# Patient Record
(demographics unavailable — no encounter records)

---

## 2025-02-24 NOTE — ASSESSMENT
[FreeTextEntry1] : I discussed the findings and options with Ms. MARK MCHUGH in detail.   We reviewed the implications of OAB medications, anticholinergic v beta agonists. I have described the risks and benefits to this approach.   - We agreed that she will start trial of gemtesa daily for 1 month to manage her OAB. - Urine was sent for culture to r/o infection.  - She has an upcoming appt with oncologist- consult possible estrogen cream.   F/u 1 month. Patient expressed understanding.    The total amount of time I have personally spent preparing for this visit, reviewing the patient's test results, obtaining external history, ordering tests/medications, documenting clinical information, communicating with and counseling the patient/family and/or caregiver(s), reviewing old records, and spent face to face with the patient explaining the above was 35 minutes.

## 2025-02-24 NOTE — ADDENDUM
[FreeTextEntry1] : A portion of this note was written by [Inocente Alcocer] on 02/19/2025 acting as a scribe for Dr. Randle.   I have personally reviewed the chart and agree that the record accurately reflects my personal performance of the history, physical exam, assessment, and plan.

## 2025-02-24 NOTE — HISTORY OF PRESENT ILLNESS
[FreeTextEntry1] : 2025 -- 91 F  with urinary incontinence, complex GI hx.   Reports worsening OAB and urinary leakage- she wants to address this.  She continues miralax for constipation.   10/23/2023 -- Ms. MARK MCHUGH comes in today for her Urologic evaluation. Pt is a 88 yo F  with complex GI hx who presents with urinary incontinence.   She endorses urinary urgency, frequency, leakage. These symptoms became worse after a recent hospitalization at [facility] for a bowel obstruction managed conservatively.  She is most bothered at night. She leaks when she has the urge to void. Pt wears incontinence pads for protection (started 6mos ago, sz 3 in the AM, sz 6 PM). She also describes urinary frequency and has intermittent dribbling. She denies sensation of prolpase, dysuria, gross hematuria, fever and chills. She has no recent urine culture.  Denies hx renal stones.   GI hx: Recently presented to [facility] for small bowel obstruction--conservatively managed with NG tube as per pt. She takes MiraLax to manage constipation. She has not had a recent colonoscopy.   PMH: IBS, Anxiety, Arthritis, HTN  PSH: Hysterectomy oophorectomy, Cataract, x2 colon ?SBO procedure FH: no  malignancies SocH: non smoker, no alcohol  Meds: Losartan, Lexapro, Estradiol, Miralax **attached intake. Allergies: Penicillin